# Patient Record
Sex: MALE | Race: WHITE | NOT HISPANIC OR LATINO | URBAN - METROPOLITAN AREA
[De-identification: names, ages, dates, MRNs, and addresses within clinical notes are randomized per-mention and may not be internally consistent; named-entity substitution may affect disease eponyms.]

---

## 2022-02-17 ENCOUNTER — OUTPATIENT (OUTPATIENT)
Dept: OUTPATIENT SERVICES | Facility: HOSPITAL | Age: 37
LOS: 1 days | End: 2022-02-17
Payer: COMMERCIAL

## 2022-02-17 VITALS
RESPIRATION RATE: 16 BRPM | HEART RATE: 79 BPM | TEMPERATURE: 98 F | OXYGEN SATURATION: 100 % | DIASTOLIC BLOOD PRESSURE: 76 MMHG | SYSTOLIC BLOOD PRESSURE: 122 MMHG | HEIGHT: 71 IN | WEIGHT: 176.37 LBS

## 2022-02-17 DIAGNOSIS — Z98.890 OTHER SPECIFIED POSTPROCEDURAL STATES: Chronic | ICD-10-CM

## 2022-02-17 DIAGNOSIS — Z41.9 ENCOUNTER FOR PROCEDURE FOR PURPOSES OTHER THAN REMEDYING HEALTH STATE, UNSPECIFIED: Chronic | ICD-10-CM

## 2022-02-17 DIAGNOSIS — Z90.89 ACQUIRED ABSENCE OF OTHER ORGANS: Chronic | ICD-10-CM

## 2022-02-17 DIAGNOSIS — S53.401D UNSPECIFIED SPRAIN OF RIGHT ELBOW, SUBSEQUENT ENCOUNTER: ICD-10-CM

## 2022-02-17 DIAGNOSIS — M70.21 OLECRANON BURSITIS, RIGHT ELBOW: ICD-10-CM

## 2022-02-17 PROCEDURE — G0463: CPT | Mod: 25

## 2022-02-17 NOTE — H&P PST ADULT - NSANTHOSAYNRD_GEN_A_CORE
No. ASHLIE screening performed.  STOP BANG Legend: 0-2 = LOW Risk; 3-4 = INTERMEDIATE Risk; 5-8 = HIGH Risk

## 2022-02-17 NOTE — H&P PST ADULT - NSICDXPASTSURGICALHX_GEN_ALL_CORE_FT
PAST SURGICAL HISTORY:  Elective surgery lipomas removed from anterior chest wall 2019    H/O oral surgery extraction of wisdom teeth    History of tonsillectomy 2005

## 2022-02-17 NOTE — H&P PST ADULT - ASSESSMENT
This is a 37 y/o male with Right elbow pain who is scheduled for a Right elbow bursectomy    Patient instructed on     1. To follow instructions as per ASU for NPO status   2. On the use of EZ sponges  3. Reports he was instructed to have medical clearance. Was done by Dr. Kameron Deshpande  4. Instructed to call [phone] to confirm COVID 19 appointment which is scheduled for a 2/21/2021
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
## 2022-02-18 DIAGNOSIS — M70.21 OLECRANON BURSITIS, RIGHT ELBOW: ICD-10-CM

## 2022-02-18 DIAGNOSIS — S53.401D UNSPECIFIED SPRAIN OF RIGHT ELBOW, SUBSEQUENT ENCOUNTER: ICD-10-CM

## 2022-02-24 ENCOUNTER — OUTPATIENT (OUTPATIENT)
Dept: INPATIENT UNIT | Facility: HOSPITAL | Age: 37
LOS: 1 days | Discharge: HOME | End: 2022-02-24
Payer: COMMERCIAL

## 2022-02-24 ENCOUNTER — RESULT REVIEW (OUTPATIENT)
Age: 37
End: 2022-02-24

## 2022-02-24 ENCOUNTER — TRANSCRIPTION ENCOUNTER (OUTPATIENT)
Age: 37
End: 2022-02-24

## 2022-02-24 VITALS
DIASTOLIC BLOOD PRESSURE: 78 MMHG | OXYGEN SATURATION: 99 % | HEART RATE: 60 BPM | SYSTOLIC BLOOD PRESSURE: 115 MMHG | RESPIRATION RATE: 16 BRPM | TEMPERATURE: 98 F

## 2022-02-24 VITALS
HEART RATE: 65 BPM | OXYGEN SATURATION: 100 % | RESPIRATION RATE: 16 BRPM | TEMPERATURE: 98 F | SYSTOLIC BLOOD PRESSURE: 122 MMHG | HEIGHT: 71 IN | WEIGHT: 176.37 LBS | DIASTOLIC BLOOD PRESSURE: 82 MMHG

## 2022-02-24 DIAGNOSIS — S53.401D UNSPECIFIED SPRAIN OF RIGHT ELBOW, SUBSEQUENT ENCOUNTER: ICD-10-CM

## 2022-02-24 DIAGNOSIS — Z90.89 ACQUIRED ABSENCE OF OTHER ORGANS: Chronic | ICD-10-CM

## 2022-02-24 DIAGNOSIS — Z41.9 ENCOUNTER FOR PROCEDURE FOR PURPOSES OTHER THAN REMEDYING HEALTH STATE, UNSPECIFIED: Chronic | ICD-10-CM

## 2022-02-24 DIAGNOSIS — Z98.890 OTHER SPECIFIED POSTPROCEDURAL STATES: Chronic | ICD-10-CM

## 2022-02-24 DIAGNOSIS — M70.21 OLECRANON BURSITIS, RIGHT ELBOW: ICD-10-CM

## 2022-02-24 PROCEDURE — 88304 TISSUE EXAM BY PATHOLOGIST: CPT | Mod: 26

## 2022-02-24 PROCEDURE — 88304 TISSUE EXAM BY PATHOLOGIST: CPT

## 2022-02-24 RX ORDER — ACETAMINOPHEN 500 MG
1000 TABLET ORAL ONCE
Refills: 0 | Status: DISCONTINUED | OUTPATIENT
Start: 2022-02-24 | End: 2022-02-24

## 2022-02-24 RX ORDER — FENTANYL CITRATE 50 UG/ML
50 INJECTION INTRAVENOUS
Refills: 0 | Status: DISCONTINUED | OUTPATIENT
Start: 2022-02-24 | End: 2022-02-24

## 2022-02-24 RX ORDER — OXYCODONE HYDROCHLORIDE 5 MG/1
1 TABLET ORAL
Qty: 10 | Refills: 0
Start: 2022-02-24

## 2022-02-24 RX ORDER — PROCHLORPERAZINE MALEATE 5 MG
10 TABLET ORAL ONCE
Refills: 0 | Status: DISCONTINUED | OUTPATIENT
Start: 2022-02-24 | End: 2022-02-24

## 2022-02-24 RX ORDER — ONDANSETRON 8 MG/1
4 TABLET, FILM COATED ORAL ONCE
Refills: 0 | Status: DISCONTINUED | OUTPATIENT
Start: 2022-02-24 | End: 2022-02-24

## 2022-02-24 RX ORDER — HYDROMORPHONE HYDROCHLORIDE 2 MG/ML
0.5 INJECTION INTRAMUSCULAR; INTRAVENOUS; SUBCUTANEOUS
Refills: 0 | Status: DISCONTINUED | OUTPATIENT
Start: 2022-02-24 | End: 2022-02-24

## 2022-02-24 RX ORDER — DOCUSATE SODIUM 100 MG
1 CAPSULE ORAL
Qty: 60 | Refills: 0
Start: 2022-02-24 | End: 2022-03-02

## 2022-02-24 RX ORDER — SODIUM CHLORIDE 9 MG/ML
1000 INJECTION, SOLUTION INTRAVENOUS
Refills: 0 | Status: DISCONTINUED | OUTPATIENT
Start: 2022-02-24 | End: 2022-02-24

## 2022-02-24 RX ORDER — OXYCODONE HYDROCHLORIDE 5 MG/1
5 TABLET ORAL ONCE
Refills: 0 | Status: DISCONTINUED | OUTPATIENT
Start: 2022-02-24 | End: 2022-02-24

## 2022-02-24 RX ORDER — ONDANSETRON 8 MG/1
1 TABLET, FILM COATED ORAL
Qty: 10 | Refills: 0
Start: 2022-02-24

## 2022-02-24 NOTE — ASU PREOP CHECKLIST - HEIGHT IN FEET
5
PAST SURGICAL HISTORY:  H/O endoscopy     History of gastric surgery sleeve - 2014    History of tonsillectomy age 7

## 2022-02-24 NOTE — ASU DISCHARGE PLAN (ADULT/PEDIATRIC) - NS MD DC FALL RISK RISK
For information on Fall & Injury Prevention, visit: [email or web address] OR  [email or web address] OR  https://www.cdc.gov/steadi/patient.html

## 2022-02-24 NOTE — ASU DISCHARGE PLAN (ADULT/PEDIATRIC) - CARE PROVIDER_API CALL
Hong Dale)  Orthopaedic Surgery  [address], [address]  [place] [address]  Phone: [phone]  Fax: [phone]  Follow Up Time:

## 2022-02-24 NOTE — ASU DISCHARGE PLAN (ADULT/PEDIATRIC) - ASU DC SPECIAL INSTRUCTIONSFT
1. Keep bandage on for 7 days. Then you can remove and get it wet. Otherwise cover hand with plastic bag for showering.    2. Elevate hand as much as possible and wiggle fingers as much as you can, as often as you think of it (if you are watching TV every commercial wiggle 10 times, or reading a book wiggle 10 times after every 5 pages read). The exception is if you have a splint you will not be able to wiggle the affected digit. Try to wiggle the free ones though.    3. Walk plenty, no sitting around.    5. See Dr. Dale in the office in about 7-10 days. Call to schedule. You will have your wound checked then, any sutures will be removed, and your splint will be changed if you have one on.

## 2022-02-24 NOTE — ASU PATIENT PROFILE, ADULT - FALL HARM RISK - UNIVERSAL INTERVENTIONS
Bed in lowest position, wheels locked, appropriate side rails in place/Call bell, personal items and telephone in reach/Instruct patient to call for assistance before getting out of bed or chair/Non-slip footwear when patient is out of bed/[place] to call system/Physically safe environment - no spills, clutter or unnecessary equipment/Purposeful Proactive Rounding/Room/bathroom lighting operational, light cord in reach

## 2022-03-02 DIAGNOSIS — M70.31 OTHER BURSITIS OF ELBOW, RIGHT ELBOW: ICD-10-CM
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
## 2022-03-09 PROBLEM — Z00.00 ENCOUNTER FOR PREVENTIVE HEALTH EXAMINATION: Status: ACTIVE | Noted: 2022-03-09

## 2022-08-08 ENCOUNTER — APPOINTMENT (OUTPATIENT)
Dept: ORTHOPEDIC SURGERY | Facility: CLINIC | Age: 37
End: 2022-08-08

## 2022-08-08 VITALS — HEIGHT: 71 IN | BODY MASS INDEX: 24.5 KG/M2 | WEIGHT: 175 LBS

## 2022-08-08 DIAGNOSIS — Z78.9 OTHER SPECIFIED HEALTH STATUS: ICD-10-CM

## 2022-08-08 PROCEDURE — 26600 TREAT METACARPAL FRACTURE: CPT

## 2022-08-08 PROCEDURE — 99204 OFFICE O/P NEW MOD 45 MIN: CPT | Mod: 57

## 2022-08-08 PROCEDURE — 99072 ADDL SUPL MATRL&STAF TM PHE: CPT

## 2022-08-08 RX ORDER — DOCUSATE SODIUM 100 MG/1
100 CAPSULE, LIQUID FILLED ORAL
Qty: 60 | Refills: 0 | Status: DISCONTINUED | COMMUNITY
Start: 2022-02-24

## 2022-08-08 RX ORDER — OXYCODONE 5 MG/1
5 TABLET ORAL
Qty: 10 | Refills: 0 | Status: DISCONTINUED | COMMUNITY
Start: 2022-02-24

## 2022-08-08 NOTE — IMAGING
[de-identified] : Left hand with dorsal swelling. Skin intact. +ttp at 3rd metacarpal, -ecchymosis. Able to make gentle fist with no rotational deformity. Sensation intact throughout. <2sec cap refill.\par \par Right hand radiographs with 3rd metacarpal shaft, spiral fracture, minimally displaced.

## 2022-08-08 NOTE — HISTORY OF PRESENT ILLNESS
[de-identified] : 36M, RHD, No PMHX presents with left hand injury from 8/8/22 The patient reports he was playing roller hockey, put his hand out to do a save and had pain. did go to [facility], advised of a metacarpal fracture. They did do radiographs - but does not have disc with him. Is splinted. Admits to some pain with movement, denies numbness/tingling. \par \par Patient works as a .

## 2022-08-08 NOTE — ASSESSMENT
[FreeTextEntry1] : Left 3rd metacarpal fracture, non displaced - will manage with closed management [CPT 86093]\par \par Reviewed radiographs with patient. Discussed radiograph alignment is within acceptable parameters for closed management. NWB, Elevate. Discussed risk of pain, stiffness and displacement requiring further intervention. Also discussed risk of post-traumatic arthritis. Script for intrinsic splint provided.\par \par F/u 2weeks; repeat films

## 2022-08-22 ENCOUNTER — APPOINTMENT (OUTPATIENT)
Dept: ORTHOPEDIC SURGERY | Facility: CLINIC | Age: 37
End: 2022-08-22

## 2022-08-22 PROCEDURE — 99024 POSTOP FOLLOW-UP VISIT: CPT

## 2022-08-22 PROCEDURE — 73130 X-RAY EXAM OF HAND: CPT | Mod: LT

## 2022-08-22 NOTE — HISTORY OF PRESENT ILLNESS
[de-identified] : 36M, RHD, No PMHX presents with left hand injury from 8/8/22 The patient reports he was playing roller hockey, put his hand out to do a save and had pain. did go to [facility], advised of a metacarpal fracture. They did do radiographs - but does not have disc with him. Is splinted. Admits to some pain with movement, denies numbness/tingling. \par \par Patient works as a . \par \par 8/22/22: f/u left hand. Reports having pain still, not feeling much better. Wearing brace.

## 2022-08-22 NOTE — ASSESSMENT
[FreeTextEntry1] : Left 3rd metacarpal fracture, non displaced - will continue to manage with closed management [CPT 26344]\par \par Reviewed radiographs with patient. Discussed radiograph alignment is within acceptable parameters for closed management. NWB, Elevate. Discussed risk of pain, stiffness and displacement requiring further intervention. Also discussed risk of post-traumatic arthritis. Continue brace for 1.5 weeks\par \par F/u 2weeks; repeat films. Advance motion

## 2022-08-22 NOTE — IMAGING
[de-identified] : Left hand with improving dorsal swelling. Skin intact. +ttp at 3rd metacarpal, -ecchymosis. Able to make gentle fist with no rotational deformity. Sensation intact throughout. <2sec cap refill.\par \par Right hand radiographs with 3rd metacarpal shaft, spiral fracture, minimally displaced.  Alignment maintained.

## 2022-09-02 ENCOUNTER — APPOINTMENT (OUTPATIENT)
Dept: ORTHOPEDIC SURGERY | Facility: CLINIC | Age: 37
End: 2022-09-02

## 2022-09-02 PROCEDURE — 73130 X-RAY EXAM OF HAND: CPT | Mod: LT

## 2022-09-02 PROCEDURE — 99024 POSTOP FOLLOW-UP VISIT: CPT

## 2022-09-04 NOTE — ASSESSMENT
[FreeTextEntry1] : Left 3rd metacarpal fracture, non displaced - will continue to manage with closed management [CPT 06888]\par \par Reviewed radiographs with patient. Discussed radiograph alignment is within acceptable parameters for closed management. NWB, Elevate. Discussed risk of pain, stiffness and displacement requiring further intervention. Also discussed risk of post-traumatic arthritis. D/c brace. OT for ROM\par \par F/u 6weeks; ROM and strengthening (indoor vball)

## 2022-09-04 NOTE — IMAGING
[de-identified] : Left hand with improving dorsal swelling. Skin intact. -ttp at 3rd metacarpal, -ecchymosis. Able to make gentle fist with no rotational deformity. Sensation intact throughout. <2sec cap refill.\par \par Right hand radiographs with 3rd metacarpal shaft, spiral fracture, minimally displaced.  Alignment maintained. +callus

## 2022-09-04 NOTE — HISTORY OF PRESENT ILLNESS
[de-identified] : 36M, RHD, No PMHX presents with left hand injury from 8/8/22 The patient reports he was playing roller hockey, put his hand out to do a save and had pain. did go to [facility], advised of a metacarpal fracture. They did do radiographs - but does not have disc with him. Is splinted. Admits to some pain with movement, denies numbness/tingling. \par \par Patient works as a . \par \par 8/22/22: f/u left hand. Reports having pain still, not feeling much better. Wearing brace. \par \par 9/2/22: f/u left hand. Denies pain, denies numbness/tingling. Denies brace wearing. No constitutional symptoms.

## 2022-09-16 ENCOUNTER — APPOINTMENT (OUTPATIENT)
Dept: ORTHOPEDIC SURGERY | Facility: CLINIC | Age: 37
End: 2022-09-16

## 2022-09-16 DIAGNOSIS — S62.308A UNSPECIFIED FRACTURE OF OTHER METACARPAL BONE, INITIAL ENCOUNTER FOR CLOSED FRACTURE: ICD-10-CM

## 2022-09-16 PROCEDURE — 99024 POSTOP FOLLOW-UP VISIT: CPT

## 2022-09-16 PROCEDURE — 73130 X-RAY EXAM OF HAND: CPT | Mod: LT

## 2022-09-16 NOTE — IMAGING
[de-identified] : Left hand with resolved dorsal swelling. Skin intact. -ttp at 3rd metacarpal, -ecchymosis. Able to make gentle fist with no rotational deformity. Sensation intact throughout. <2sec cap refill.\par \par Right hand radiographs with 3rd metacarpal shaft, spiral fracture, minimally displaced.  Alignment maintained. Healed.

## 2022-09-16 NOTE — HISTORY OF PRESENT ILLNESS
[de-identified] : 36M, RHD, No PMHX presents with left hand injury from 8/8/22 The patient reports he was playing roller hockey, put his hand out to do a save and had pain. did go to [facility], advised of a metacarpal fracture. They did do radiographs - but does not have disc with him. Is splinted. Admits to some pain with movement, denies numbness/tingling. \par \par Patient works as a . \par \par 8/22/22: f/u left hand. Reports having pain still, not feeling much better. Wearing brace. \par \par 9/2/22: f/u left hand. Denies pain, denies numbness/tingling. Denies brace wearing. No constitutional symptoms. \par \par 9/16/22: f/u left hand. Denies pain, denies numbness/tingling. denies bracing. Has started hockey again. No constitutional symptoms.

## 2022-09-16 NOTE — ASSESSMENT
[FreeTextEntry1] : Left 3rd metacarpal fracture, non displaced - will continue to manage with closed management [CPT 17199]\par \par Reviewed radiographs with patient. Discussed radiograph alignment is within acceptable parameters for closed management.  Discussed risk of pain, stiffness and displacement requiring further intervention. Also discussed risk of post-traumatic arthritis.WBAT.\par \par F/u 8weeks prn (indoor vball)

## 2022-09-30 ENCOUNTER — APPOINTMENT (OUTPATIENT)
Dept: ORTHOPEDIC SURGERY | Facility: CLINIC | Age: 37
End: 2022-09-30
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
## 2023-10-09 NOTE — H&P PST ADULT - PSYCHIATRIC
Order received for CT lung screening. EMR and order reviewed. Information regarding ct lung screening and smoking cessation referral mailed to patient.
negative
